# Patient Record
Sex: MALE | Race: OTHER | NOT HISPANIC OR LATINO | URBAN - METROPOLITAN AREA
[De-identification: names, ages, dates, MRNs, and addresses within clinical notes are randomized per-mention and may not be internally consistent; named-entity substitution may affect disease eponyms.]

---

## 2019-05-16 ENCOUNTER — INPATIENT (INPATIENT)
Facility: HOSPITAL | Age: 44
LOS: 3 days | Discharge: HOME | End: 2019-05-20
Attending: INTERNAL MEDICINE | Admitting: INTERNAL MEDICINE
Payer: COMMERCIAL

## 2019-05-16 VITALS
RESPIRATION RATE: 16 BRPM | DIASTOLIC BLOOD PRESSURE: 87 MMHG | OXYGEN SATURATION: 100 % | HEART RATE: 80 BPM | SYSTOLIC BLOOD PRESSURE: 131 MMHG

## 2019-05-16 DIAGNOSIS — Z98.84 BARIATRIC SURGERY STATUS: ICD-10-CM

## 2019-05-16 DIAGNOSIS — Z98.84 BARIATRIC SURGERY STATUS: Chronic | ICD-10-CM

## 2019-05-16 LAB
ALBUMIN SERPL ELPH-MCNC: 5.2 G/DL — HIGH (ref 3.3–5)
ALP SERPL-CCNC: 69 U/L — SIGNIFICANT CHANGE UP (ref 40–120)
ALT FLD-CCNC: 17 U/L — SIGNIFICANT CHANGE UP (ref 4–41)
ANION GAP SERPL CALC-SCNC: 15 MMO/L — HIGH (ref 7–14)
AST SERPL-CCNC: 22 U/L — SIGNIFICANT CHANGE UP (ref 4–40)
BASE EXCESS BLDV CALC-SCNC: 1.6 MMOL/L — SIGNIFICANT CHANGE UP
BASE EXCESS BLDV CALC-SCNC: 2.8 MMOL/L — SIGNIFICANT CHANGE UP
BASOPHILS # BLD AUTO: 0.03 K/UL — SIGNIFICANT CHANGE UP (ref 0–0.2)
BASOPHILS NFR BLD AUTO: 0.2 % — SIGNIFICANT CHANGE UP (ref 0–2)
BILIRUB SERPL-MCNC: 0.9 MG/DL — SIGNIFICANT CHANGE UP (ref 0.2–1.2)
BLD GP AB SCN SERPL QL: NEGATIVE — SIGNIFICANT CHANGE UP
BLOOD GAS VENOUS - CREATININE: 0.89 MG/DL — SIGNIFICANT CHANGE UP (ref 0.5–1.3)
BLOOD GAS VENOUS - CREATININE: 0.89 MG/DL — SIGNIFICANT CHANGE UP (ref 0.5–1.3)
BLOOD GAS VENOUS - FIO2: 21 — SIGNIFICANT CHANGE UP
BLOOD GAS VENOUS - FIO2: 21 — SIGNIFICANT CHANGE UP
BUN SERPL-MCNC: 16 MG/DL — SIGNIFICANT CHANGE UP (ref 7–23)
CALCIUM SERPL-MCNC: 10.4 MG/DL — SIGNIFICANT CHANGE UP (ref 8.4–10.5)
CHLORIDE BLDV-SCNC: 106 MMOL/L — SIGNIFICANT CHANGE UP (ref 96–108)
CHLORIDE BLDV-SCNC: 106 MMOL/L — SIGNIFICANT CHANGE UP (ref 96–108)
CHLORIDE SERPL-SCNC: 101 MMOL/L — SIGNIFICANT CHANGE UP (ref 98–107)
CO2 SERPL-SCNC: 26 MMOL/L — SIGNIFICANT CHANGE UP (ref 22–31)
CREAT SERPL-MCNC: 1.13 MG/DL — SIGNIFICANT CHANGE UP (ref 0.5–1.3)
EOSINOPHIL # BLD AUTO: 0.01 K/UL — SIGNIFICANT CHANGE UP (ref 0–0.5)
EOSINOPHIL NFR BLD AUTO: 0.1 % — SIGNIFICANT CHANGE UP (ref 0–6)
GAS PNL BLDV: 134 MMOL/L — LOW (ref 136–146)
GAS PNL BLDV: 135 MMOL/L — LOW (ref 136–146)
GLUCOSE BLDV-MCNC: 118 MG/DL — HIGH (ref 70–99)
GLUCOSE BLDV-MCNC: 122 MG/DL — HIGH (ref 70–99)
GLUCOSE SERPL-MCNC: 129 MG/DL — HIGH (ref 70–99)
HCO3 BLDV-SCNC: 24 MMOL/L — SIGNIFICANT CHANGE UP (ref 20–27)
HCO3 BLDV-SCNC: 25 MMOL/L — SIGNIFICANT CHANGE UP (ref 20–27)
HCT VFR BLD CALC: 47.1 % — SIGNIFICANT CHANGE UP (ref 39–50)
HCT VFR BLDV CALC: 43.9 % — SIGNIFICANT CHANGE UP (ref 39–51)
HCT VFR BLDV CALC: 47.7 % — SIGNIFICANT CHANGE UP (ref 39–51)
HGB BLD-MCNC: 15.2 G/DL — SIGNIFICANT CHANGE UP (ref 13–17)
HGB BLDV-MCNC: 14.3 G/DL — SIGNIFICANT CHANGE UP (ref 13–17)
HGB BLDV-MCNC: 15.6 G/DL — SIGNIFICANT CHANGE UP (ref 13–17)
IMM GRANULOCYTES NFR BLD AUTO: 0.4 % — SIGNIFICANT CHANGE UP (ref 0–1.5)
INR BLD: 1.04 — SIGNIFICANT CHANGE UP (ref 0.88–1.17)
LACTATE BLDV-MCNC: 1.9 MMOL/L — SIGNIFICANT CHANGE UP (ref 0.5–2)
LACTATE BLDV-MCNC: 2.3 MMOL/L — HIGH (ref 0.5–2)
LIDOCAIN IGE QN: 26.4 U/L — SIGNIFICANT CHANGE UP (ref 7–60)
LYMPHOCYTES # BLD AUTO: 1.35 K/UL — SIGNIFICANT CHANGE UP (ref 1–3.3)
LYMPHOCYTES # BLD AUTO: 9.3 % — LOW (ref 13–44)
MCHC RBC-ENTMCNC: 28 PG — SIGNIFICANT CHANGE UP (ref 27–34)
MCHC RBC-ENTMCNC: 32.3 % — SIGNIFICANT CHANGE UP (ref 32–36)
MCV RBC AUTO: 86.7 FL — SIGNIFICANT CHANGE UP (ref 80–100)
MONOCYTES # BLD AUTO: 0.77 K/UL — SIGNIFICANT CHANGE UP (ref 0–0.9)
MONOCYTES NFR BLD AUTO: 5.3 % — SIGNIFICANT CHANGE UP (ref 2–14)
NEUTROPHILS # BLD AUTO: 12.23 K/UL — HIGH (ref 1.8–7.4)
NEUTROPHILS NFR BLD AUTO: 84.7 % — HIGH (ref 43–77)
NRBC # FLD: 0 K/UL — SIGNIFICANT CHANGE UP (ref 0–0)
PCO2 BLDV: 47 MMHG — SIGNIFICANT CHANGE UP (ref 41–51)
PCO2 BLDV: 54 MMHG — HIGH (ref 41–51)
PH BLDV: 7.34 PH — SIGNIFICANT CHANGE UP (ref 7.32–7.43)
PH BLDV: 7.37 PH — SIGNIFICANT CHANGE UP (ref 7.32–7.43)
PLATELET # BLD AUTO: 373 K/UL — SIGNIFICANT CHANGE UP (ref 150–400)
PMV BLD: 9.7 FL — SIGNIFICANT CHANGE UP (ref 7–13)
PO2 BLDV: 35 MMHG — SIGNIFICANT CHANGE UP (ref 35–40)
PO2 BLDV: 37 MMHG — SIGNIFICANT CHANGE UP (ref 35–40)
POTASSIUM BLDV-SCNC: 3.9 MMOL/L — SIGNIFICANT CHANGE UP (ref 3.4–4.5)
POTASSIUM BLDV-SCNC: 4.7 MMOL/L — HIGH (ref 3.4–4.5)
POTASSIUM SERPL-MCNC: 4.2 MMOL/L — SIGNIFICANT CHANGE UP (ref 3.5–5.3)
POTASSIUM SERPL-SCNC: 4.2 MMOL/L — SIGNIFICANT CHANGE UP (ref 3.5–5.3)
PROT SERPL-MCNC: 8.2 G/DL — SIGNIFICANT CHANGE UP (ref 6–8.3)
PROTHROM AB SERPL-ACNC: 11.9 SEC — SIGNIFICANT CHANGE UP (ref 9.8–13.1)
RBC # BLD: 5.43 M/UL — SIGNIFICANT CHANGE UP (ref 4.2–5.8)
RBC # FLD: 12.5 % — SIGNIFICANT CHANGE UP (ref 10.3–14.5)
RH IG SCN BLD-IMP: POSITIVE — SIGNIFICANT CHANGE UP
SAO2 % BLDV: 58.2 % — LOW (ref 60–85)
SAO2 % BLDV: 65.4 % — SIGNIFICANT CHANGE UP (ref 60–85)
SODIUM SERPL-SCNC: 142 MMOL/L — SIGNIFICANT CHANGE UP (ref 135–145)
WBC # BLD: 14.45 K/UL — HIGH (ref 3.8–10.5)
WBC # FLD AUTO: 14.45 K/UL — HIGH (ref 3.8–10.5)

## 2019-05-16 PROCEDURE — 99223 1ST HOSP IP/OBS HIGH 75: CPT

## 2019-05-16 PROCEDURE — 74177 CT ABD & PELVIS W/CONTRAST: CPT | Mod: 26

## 2019-05-16 RX ORDER — CIPROFLOXACIN LACTATE 400MG/40ML
400 VIAL (ML) INTRAVENOUS ONCE
Refills: 0 | Status: COMPLETED | OUTPATIENT
Start: 2019-05-16 | End: 2019-05-16

## 2019-05-16 RX ORDER — ONDANSETRON 8 MG/1
4 TABLET, FILM COATED ORAL ONCE
Refills: 0 | Status: COMPLETED | OUTPATIENT
Start: 2019-05-16 | End: 2019-05-16

## 2019-05-16 RX ORDER — MORPHINE SULFATE 50 MG/1
4 CAPSULE, EXTENDED RELEASE ORAL ONCE
Refills: 0 | Status: DISCONTINUED | OUTPATIENT
Start: 2019-05-16 | End: 2019-05-16

## 2019-05-16 RX ORDER — SODIUM CHLORIDE 9 MG/ML
1000 INJECTION INTRAMUSCULAR; INTRAVENOUS; SUBCUTANEOUS ONCE
Refills: 0 | Status: COMPLETED | OUTPATIENT
Start: 2019-05-16 | End: 2019-05-16

## 2019-05-16 RX ORDER — METRONIDAZOLE 500 MG
500 TABLET ORAL ONCE
Refills: 0 | Status: COMPLETED | OUTPATIENT
Start: 2019-05-16 | End: 2019-05-16

## 2019-05-16 RX ADMIN — MORPHINE SULFATE 4 MILLIGRAM(S): 50 CAPSULE, EXTENDED RELEASE ORAL at 17:23

## 2019-05-16 RX ADMIN — MORPHINE SULFATE 4 MILLIGRAM(S): 50 CAPSULE, EXTENDED RELEASE ORAL at 22:05

## 2019-05-16 RX ADMIN — MORPHINE SULFATE 4 MILLIGRAM(S): 50 CAPSULE, EXTENDED RELEASE ORAL at 17:45

## 2019-05-16 RX ADMIN — Medication 200 MILLIGRAM(S): at 19:42

## 2019-05-16 RX ADMIN — Medication 100 MILLIGRAM(S): at 20:39

## 2019-05-16 RX ADMIN — SODIUM CHLORIDE 1000 MILLILITER(S): 9 INJECTION INTRAMUSCULAR; INTRAVENOUS; SUBCUTANEOUS at 17:23

## 2019-05-16 RX ADMIN — ONDANSETRON 4 MILLIGRAM(S): 8 TABLET, FILM COATED ORAL at 17:23

## 2019-05-16 NOTE — ED ADULT TRIAGE NOTE - CHIEF COMPLAINT QUOTE
Pt c/o abd pain, N/V and constipation since this am.  Denies chills.  Pt with hx of gastric sleeve Pt c/o abd pain, radiating to lower back ,  N/V and constipation since this am.  Denies chills.  Pt with hx of gastric sleeve

## 2019-05-16 NOTE — ED ADULT NURSE NOTE - OBJECTIVE STATEMENT
Pt a&ox3 c/o abdominal pain with n/v and constipation since this morning, breathing even and unlabored, denies cp/discomfort, denies headache/dizziness, abd soft, tender, non-distended, skin is cool dry and intact, ivl placed, labs sent, md at bedside, will continue to monitor.

## 2019-05-16 NOTE — ED PROVIDER NOTE - ATTENDING CONTRIBUTION TO CARE
44 year old male c/o abdominal pain x one day with nausea. PE: NAD, + RLQ tenderness with guarding. I&P: 44 year old male c/o abdominal pain x one day with nausea. PE: NAD, + RLQ tenderness with guarding. I&P: CT demonstrates ileus and terminal ileitis, abx given, surgery consulted, will admit to CDU for observation 44 year old male c/o abdominal pain x one day with nausea. PE: NAD, + RLQ tenderness with guarding. I&P: CT demonstrates ileus and terminal ileitis, abx given, surgery consulted, will admit observation and symptom control.

## 2019-05-16 NOTE — ED PROVIDER NOTE - PROGRESS NOTE DETAILS
ANA MARIA CLIFTON:  General surgery has no further recommendations at this time other than bowel rest and gi referral.  pt accepted to Dr. Reynolds.  MAR text paged at this time.

## 2019-05-16 NOTE — ED ADULT NURSE NOTE - CHIEF COMPLAINT QUOTE
Pt c/o abd pain, radiating to lower back ,  N/V and constipation since this am.  Denies chills.  Pt with hx of gastric sleeve

## 2019-05-16 NOTE — ED ADULT NURSE NOTE - NSIMPLEMENTINTERV_GEN_ALL_ED
Implemented All Universal Safety Interventions:  [place] to call system. Call bell, personal items and telephone within reach. Instruct patient to call for assistance. Room bathroom lighting operational. Non-slip footwear when patient is off stretcher. Physically safe environment: no spills, clutter or unnecessary equipment. Stretcher in lowest position, wheels locked, appropriate side rails in place.

## 2019-05-16 NOTE — ED PROVIDER NOTE - CLINICAL SUMMARY MEDICAL DECISION MAKING FREE TEXT BOX
Pt is a 45 y/o M nonsmoker PMHx gastric sleeve (2016) p/w abdominal pain today -- r/o bowel obstruction, possible pancreatitis, low clinical concern for aortic dissection or ruptured AAA -- labs, lipase, vbg, pre-ops, ct abd and pelvis, pain control

## 2019-05-17 DIAGNOSIS — K50.012 CROHN'S DISEASE OF SMALL INTESTINE WITH INTESTINAL OBSTRUCTION: ICD-10-CM

## 2019-05-17 DIAGNOSIS — Z29.9 ENCOUNTER FOR PROPHYLACTIC MEASURES, UNSPECIFIED: ICD-10-CM

## 2019-05-17 LAB
ALBUMIN SERPL ELPH-MCNC: 4.3 G/DL — SIGNIFICANT CHANGE UP (ref 3.3–5)
ALP SERPL-CCNC: 59 U/L — SIGNIFICANT CHANGE UP (ref 40–120)
ALT FLD-CCNC: 11 U/L — SIGNIFICANT CHANGE UP (ref 4–41)
ANION GAP SERPL CALC-SCNC: 11 MMO/L — SIGNIFICANT CHANGE UP (ref 7–14)
APTT BLD: 31.2 SEC — SIGNIFICANT CHANGE UP (ref 27.5–36.3)
AST SERPL-CCNC: 13 U/L — SIGNIFICANT CHANGE UP (ref 4–40)
BASOPHILS # BLD AUTO: 0.02 K/UL — SIGNIFICANT CHANGE UP (ref 0–0.2)
BASOPHILS NFR BLD AUTO: 0.2 % — SIGNIFICANT CHANGE UP (ref 0–2)
BILIRUB SERPL-MCNC: 1 MG/DL — SIGNIFICANT CHANGE UP (ref 0.2–1.2)
BUN SERPL-MCNC: 13 MG/DL — SIGNIFICANT CHANGE UP (ref 7–23)
CALCIUM SERPL-MCNC: 9.2 MG/DL — SIGNIFICANT CHANGE UP (ref 8.4–10.5)
CHLORIDE SERPL-SCNC: 103 MMOL/L — SIGNIFICANT CHANGE UP (ref 98–107)
CO2 SERPL-SCNC: 26 MMOL/L — SIGNIFICANT CHANGE UP (ref 22–31)
CREAT SERPL-MCNC: 1.19 MG/DL — SIGNIFICANT CHANGE UP (ref 0.5–1.3)
CRP SERPL-MCNC: 10.2 MG/L — HIGH
EOSINOPHIL # BLD AUTO: 0.2 K/UL — SIGNIFICANT CHANGE UP (ref 0–0.5)
EOSINOPHIL NFR BLD AUTO: 1.8 % — SIGNIFICANT CHANGE UP (ref 0–6)
ERYTHROCYTE [SEDIMENTATION RATE] IN BLOOD: 13 MM/HR — SIGNIFICANT CHANGE UP (ref 1–15)
GLUCOSE SERPL-MCNC: 110 MG/DL — HIGH (ref 70–99)
HCT VFR BLD CALC: 43.9 % — SIGNIFICANT CHANGE UP (ref 39–50)
HGB BLD-MCNC: 14.1 G/DL — SIGNIFICANT CHANGE UP (ref 13–17)
IMM GRANULOCYTES NFR BLD AUTO: 0.4 % — SIGNIFICANT CHANGE UP (ref 0–1.5)
INR BLD: 1 — SIGNIFICANT CHANGE UP (ref 0.88–1.17)
LYMPHOCYTES # BLD AUTO: 2.66 K/UL — SIGNIFICANT CHANGE UP (ref 1–3.3)
LYMPHOCYTES # BLD AUTO: 24.2 % — SIGNIFICANT CHANGE UP (ref 13–44)
MAGNESIUM SERPL-MCNC: 2 MG/DL — SIGNIFICANT CHANGE UP (ref 1.6–2.6)
MCHC RBC-ENTMCNC: 28.1 PG — SIGNIFICANT CHANGE UP (ref 27–34)
MCHC RBC-ENTMCNC: 32.1 % — SIGNIFICANT CHANGE UP (ref 32–36)
MCV RBC AUTO: 87.5 FL — SIGNIFICANT CHANGE UP (ref 80–100)
MONOCYTES # BLD AUTO: 1.1 K/UL — HIGH (ref 0–0.9)
MONOCYTES NFR BLD AUTO: 10 % — SIGNIFICANT CHANGE UP (ref 2–14)
NEUTROPHILS # BLD AUTO: 6.98 K/UL — SIGNIFICANT CHANGE UP (ref 1.8–7.4)
NEUTROPHILS NFR BLD AUTO: 63.4 % — SIGNIFICANT CHANGE UP (ref 43–77)
NRBC # FLD: 0 K/UL — SIGNIFICANT CHANGE UP (ref 0–0)
PHOSPHATE SERPL-MCNC: 3.2 MG/DL — SIGNIFICANT CHANGE UP (ref 2.5–4.5)
PLATELET # BLD AUTO: 348 K/UL — SIGNIFICANT CHANGE UP (ref 150–400)
PMV BLD: 9.7 FL — SIGNIFICANT CHANGE UP (ref 7–13)
POTASSIUM SERPL-MCNC: 3.8 MMOL/L — SIGNIFICANT CHANGE UP (ref 3.5–5.3)
POTASSIUM SERPL-SCNC: 3.8 MMOL/L — SIGNIFICANT CHANGE UP (ref 3.5–5.3)
PROT SERPL-MCNC: 7 G/DL — SIGNIFICANT CHANGE UP (ref 6–8.3)
PROTHROM AB SERPL-ACNC: 11.4 SEC — SIGNIFICANT CHANGE UP (ref 9.8–13.1)
RBC # BLD: 5.02 M/UL — SIGNIFICANT CHANGE UP (ref 4.2–5.8)
RBC # FLD: 12.6 % — SIGNIFICANT CHANGE UP (ref 10.3–14.5)
SODIUM SERPL-SCNC: 140 MMOL/L — SIGNIFICANT CHANGE UP (ref 135–145)
WBC # BLD: 11 K/UL — HIGH (ref 3.8–10.5)
WBC # FLD AUTO: 11 K/UL — HIGH (ref 3.8–10.5)

## 2019-05-17 PROCEDURE — 99233 SBSQ HOSP IP/OBS HIGH 50: CPT

## 2019-05-17 RX ORDER — CIPROFLOXACIN LACTATE 400MG/40ML
400 VIAL (ML) INTRAVENOUS EVERY 12 HOURS
Refills: 0 | Status: DISCONTINUED | OUTPATIENT
Start: 2019-05-17 | End: 2019-05-20

## 2019-05-17 RX ORDER — METRONIDAZOLE 500 MG
500 TABLET ORAL EVERY 8 HOURS
Refills: 0 | Status: DISCONTINUED | OUTPATIENT
Start: 2019-05-17 | End: 2019-05-20

## 2019-05-17 RX ORDER — SODIUM CHLORIDE 9 MG/ML
1000 INJECTION, SOLUTION INTRAVENOUS
Refills: 0 | Status: DISCONTINUED | OUTPATIENT
Start: 2019-05-17 | End: 2019-05-19

## 2019-05-17 RX ORDER — OXYCODONE AND ACETAMINOPHEN 5; 325 MG/1; MG/1
1 TABLET ORAL EVERY 6 HOURS
Refills: 0 | Status: DISCONTINUED | OUTPATIENT
Start: 2019-05-17 | End: 2019-05-20

## 2019-05-17 RX ADMIN — SODIUM CHLORIDE 100 MILLILITER(S): 9 INJECTION, SOLUTION INTRAVENOUS at 05:14

## 2019-05-17 RX ADMIN — Medication 200 MILLIGRAM(S): at 09:53

## 2019-05-17 RX ADMIN — Medication 100 MILLIGRAM(S): at 14:29

## 2019-05-17 RX ADMIN — Medication 200 MILLIGRAM(S): at 18:07

## 2019-05-17 RX ADMIN — OXYCODONE AND ACETAMINOPHEN 1 TABLET(S): 5; 325 TABLET ORAL at 22:24

## 2019-05-17 RX ADMIN — OXYCODONE AND ACETAMINOPHEN 1 TABLET(S): 5; 325 TABLET ORAL at 15:38

## 2019-05-17 RX ADMIN — Medication 100 MILLIGRAM(S): at 21:49

## 2019-05-17 RX ADMIN — OXYCODONE AND ACETAMINOPHEN 1 TABLET(S): 5; 325 TABLET ORAL at 21:47

## 2019-05-17 RX ADMIN — Medication 100 MILLIGRAM(S): at 07:54

## 2019-05-17 RX ADMIN — OXYCODONE AND ACETAMINOPHEN 1 TABLET(S): 5; 325 TABLET ORAL at 16:07

## 2019-05-17 NOTE — H&P ADULT - PROBLEM SELECTOR PLAN 2
- DVT ppx: Pt low risk for VTE with IMPROVE VTE score 0. Encourage OOB and ambulation as tolerated.  - Diet: NPO pending GI consult

## 2019-05-17 NOTE — H&P ADULT - NSHPPHYSICALEXAM_GEN_ALL_CORE
Vital Signs Last 24 Hrs  T(C): 36.8 (17 May 2019 05:13), Max: 37.2 (16 May 2019 23:48)  T(F): 98.3 (17 May 2019 05:13), Max: 98.9 (16 May 2019 23:48)  HR: 93 (17 May 2019 05:13) (80 - 93)  BP: 116/75 (17 May 2019 05:13) (116/75 - 131/87)  BP(mean): --  RR: 18 (17 May 2019 05:13) (16 - 18)  SpO2: 98% (17 May 2019 05:13) (97% - 100%)    PHYSICAL EXAM:  General: Awake and alert.  No acute distress.  Head: Normocephalic, atraumatic.    Eyes: PERRL.  EOMI.  No scleral icterus.    Mouth: Moist MM.  No oropharyngeal exudates.    Neck: Supple.  Full range of motion.  No JVD.  No thyromegaly.  Trachea midline.  No lymphadenopathy.   Heart: RRR.  Normal S1 and S2.  No murmurs, rubs, or gallops.  No LE edema b/l.   Lungs: Good inspiratory effort.  Nonlabored breathing.  CTAB.  No wheezes, crackles, or rhonchi.    Abdomen: Hypoactive, tympanic bowel sounds.  Mild TTP in RLQ, no rebound or guarding.  Nondistended.  Hepatomegaly.  Skin: Warm and dry.  No rashes.  Extremities: No clubbing or cyanosis.  2+ peripheral pulses b/l.  Musculoskeletal: No joint deformities.  No spinal or paraspinal tenderness.  Neuro: A&Ox3.  CN II-XII intact.  5/5 motor strength in UE and LE b/l.  Tactile sensation intact in UE and LE b/l.  No focal deficits.

## 2019-05-17 NOTE — PROGRESS NOTE ADULT - ASSESSMENT
45 yo man with history of morbid obesity s/p gastric sleeve (2016) presents with 1 day of RLQ abdominal pain, found to have CT A/P findings consistent with terminal ileitis and possibly associated with mild small bowel obstruction. Evaluated by surgery, no bowel obstruction. symptoms clinically improving. no nausea vomiting, had watery BM yesterday.

## 2019-05-17 NOTE — H&P ADULT - NSHPREVIEWOFSYSTEMS_GEN_ALL_CORE
Constitutional: No weakness, fevers, chills, or weight loss  Eyes: No visual changes, double vision, or eye pain  Ears, Nose, Mouth, Throat: No runny nose, sinus pain, ear pain, tinnitus, sore throat, dysphagia, or odynophagia  Cardiovascular: No chest pain, palpitations, or LE edema  Respiratory: No cough, wheezing, hemoptysis, or shortness of breath  Gastrointestinal: +RLQ abdominal pain. +Nausea and vomiting. No diarrhea/constipation, hematemesis, BRBPR, or melena.  Genitourinary: No dysuria, frequency, urgency, or hematuria  Musculoskeletal: No joint pain, joint swelling, or decreased ROM  Skin: No pruritus or rashes  Neurologic:  No seizures, headache, paresthesias, numbness, or limb weakness  Psychiatric: No depression, anxiety, or agitation  Endocrine: No heat/cold intolerance, mood swings, sweats, polydipsia, or polyuria  Hematologic/lymphatic: No purpura, petechia, or prolonged or excessive bleeding after dental extraction / injury    Positives and pertinent negatives noted and all other systems negative.

## 2019-05-17 NOTE — H&P ADULT - NSHPSOCIALHISTORY_GEN_ALL_CORE
Tobacco: denies  EtOH: occasional EtOH use (~1 drink per month)  Illicit drugs: denies  Lives with wife and 3 sons.

## 2019-05-17 NOTE — H&P ADULT - PROBLEM SELECTOR PLAN 1
Can be 2/2 IBD, specifically Crohn's disease, vs. infectious cause vs. less likely malignancy. Pt with leukocytosis, which can be reactive, as pt with no other systemic S/S of infection.   - GI consult in AM for possible endoscopy  - Keep NPO and c/w IVF with LR at 100 cc/hr  - Will c/w IV Cipro and Flagyl for now, as antibiotics even in setting of Crohn's flare can have beneficial effect  - Serial abdominal exams  - Surgery consult obtained for possible small bowel obstruction. Recs reviewed and appreciated. Per Surgery, no bowel obstruction and no need for surgical intervention.   - Pain control with Percocet 5/325 mg q6hrs PRN for moderate-severe pain

## 2019-05-17 NOTE — PROGRESS NOTE ADULT - PROBLEM SELECTOR PLAN 1
Can be 2/2 IBD, specifically Crohn's disease, vs. infectious cause vs. less likely malignancy. Pt with leukocytosis, which can be reactive, as pt with no other systemic S/S of infection.   - GI consult in AM for possible endoscopy  - Keep NPO and c/w IVF with LR at 100 cc/hr  - Will c/w IV Cipro and Flagyl for now, as antibiotics even in setting of Crohn's flare can have beneficial effect  - Serial abdominal exams  - Surgery consult recs reviewed and appreciated. Per Surgery, no bowel obstruction and no need for surgical intervention.   - Pain control with Percocet 5/325 mg q6hrs PRN for moderate-severe pain  - GI consulted, evaluate for Crohn's.   - given improvement of symptoms, will advance diet to clear liquids and monitor response.  - ESR/CRP and possible endoscopy by GI.   - c/w Cipro/flagyl.

## 2019-05-17 NOTE — H&P ADULT - HISTORY OF PRESENT ILLNESS
43 yo man with history of morbid obesity s/p gastric sleeve (2016) presents with 1 day of RLQ abdominal pain. Pt was awoken early Thursday morning ~2am with 10/10 in intensity, nonradiating RLQ abdominal pain, described as squeezing and crampy. 45 yo man with history of morbid obesity s/p gastric sleeve (2016) presents with 1 day of RLQ abdominal pain. Pt was awoken early Thursday morning ~2am with 10/10 in intensity, nonradiating RLQ abdominal pain, described as squeezing and crampy. The pain is intermittent, with each episode lasting a few minutes. Since onset of the pain, pt's appetite has been nonexistent. Pt states that he had only a few bites of toast on Thursday. Pt denies any associated F/C, diarrhea, melena, BRBPR, recent weight loss, joint pain, or rashes. Pt's last BM was on Wednesday night and normal but has not passed any flatus since then. Pt had one brief episode of nausea with NBNB emesis on Thursday afternoon. No known family history of IBD. Pt's last meal prior to onset of the pain was pizza, which other people also ate without developing any symptoms. No CP, SOB, palpitations, cough, runny nose, sore throat, or urinary symptoms.    In the ED,  T 97.8-98.2, HR 80-88, -131/62-87, RR 16-18, O2 sats % RA.  CT A/P showed mild thickening and wall hyperenhancement of the terminal ileum, consistent with terminal ileitis, and associated with mild small bowel obstruction.

## 2019-05-17 NOTE — H&P ADULT - NSHPLABSRESULTS_GEN_ALL_CORE
Imaging personally reviewed.  CT Abdomen and Pelvis w/ IV Cont (05.16.19 @ 17:30)     FINDINGS:    LOWER CHEST: Within normal limits.    LIVER: Within normal limits.  BILE DUCTS: Normal caliber.  GALLBLADDER: Within normal limits.  SPLEEN: Within normal limits.  PANCREAS: Within normal limits.  ADRENALS: Within normal limits.  KIDNEYS/URETERS: Within normal limits.    BLADDER: Within normal limits.  REPRODUCTIVE ORGANS: Prostate within normal limits.    BOWEL: Status post sleeve gastrectomy. There is mild thickening and wall   hyperenhancement of the terminal ileum, with evidence of beaking of the   mesenteric border, best appreciated on sagittal image 44. The more   proximal loops of small bowel are mildly dilated and fluid-filled.   Appendix normal.  PERITONEUM: Trace simple ascites. No free air.  VESSELS:  Mild atherosclerotic calcifications.  RETROPERITONEUM: No lymphadenopathy.    ABDOMINAL WALL: Within normal limits.  BONES: Within normal limits.    IMPRESSION:     Terminal ileitis, which can be seen in the setting of Crohn's disease.   Associated mild small bowel obstruction.

## 2019-05-17 NOTE — H&P ADULT - ASSESSMENT
43 yo man with history of morbid obesity s/p gastric sleeve (2016) presents with 1 day of RLQ abdominal pain, found to have CT A/P findings consistent with terminal ileitis and possibly associated with mild small bowel obstruction.

## 2019-05-17 NOTE — PROGRESS NOTE ADULT - PROBLEM SELECTOR PLAN 2
- DVT ppx: Pt low risk for VTE with IMPROVE VTE score 0. Encourage OOB and ambulation as tolerated.  - Diet: clears.

## 2019-05-17 NOTE — CONSULT NOTE ADULT - ASSESSMENT
Impression:  1) RLQ abdominal pain with CT showing terminal ileitis: differential IBD (Crohn's disease > ulcerative colitis) versus infection versus medication induced (ie/ NSAIDs)   2) Small bowel obstruction as seen on CT   3) History of obesity with gastric sleeve    Recommendations:  - continue management of SBO per surgery - NPO, IVF; if patient develops distension/nausea/vomiting would place NGT for decompression   - send ESR, CRP, fecal calprotectin   - send GI PCR  - will eventually need colonoscopy pending resolution of SBO  - avoid NSAIDs  - pain control, anti-emetics as needed

## 2019-05-17 NOTE — CONSULT NOTE ADULT - SUBJECTIVE AND OBJECTIVE BOX
GASTROENTEROLOGY INITIAL CONSULT NOTE    Chief Complaint:  Patient is a 44y old  Male who presents with a chief complaint of RLQ abdominal pain (17 May 2019 05:55)      HPI: 44y Male with past medical history morbid obesity s/p gastric sleeve (2016) who presented with abdominal pain. Patient reported one day of abdominal pain which awoke him from sleep, 10/10, crampy, non-radiating, intermittent, localized to the RLQ. This has been associated with decreased appetite and nausea with one episode of vomiting. No melena, hematochezia, diarrhea, or constipation. Last BM was the day prior to admission which was formed brown stool. Since the pain started, he has not passed gas or stool. No joint pain, rash, vision changes, or weight loss. No recent travel or sick contacts. No family history of IBD. No prior colonoscopy.    Allergies:  No Known Allergies      Hospital Medications:  ciprofloxacin   IVPB 400 milliGRAM(s) IV Intermittent every 12 hours  lactated ringers. 1000 milliLiter(s) IV Continuous <Continuous>  metroNIDAZOLE  IVPB 500 milliGRAM(s) IV Intermittent every 8 hours  oxyCODONE    5 mG/acetaminophen 325 mG 1 Tablet(s) Oral every 6 hours PRN      PMHX/PSHX:  No pertinent past medical history  Status post laparoscopic sleeve gastrectomy      Family history:  No pertinent family history in first degree relatives      Social History:     ROS:     General:  No wt loss, fevers, chills, night sweats, fatigue,   Eyes:  Good vision, no reported pain  ENT:  No sore throat, pain, runny nose, dysphagia  CV:  No pain, palpitations, hypo/hypertension  Resp:  No dyspnea, cough, tachypnea, wheezing  GI:  see HPI  :  No pain, bleeding, incontinence, nocturia  Muscle:  No pain, weakness  Neuro:  No weakness, tingling, memory problems  Psych:  No fatigue, insomnia, mood problems, depression  Endocrine:  No polyuria, polydipsia, cold/heat intolerance  Heme:  No petechiae, ecchymosis, easy bruisability  Skin:  No rash, tattoos, scars, edema      PHYSICAL EXAM:   Vital Signs:  Vital Signs Last 24 Hrs  T(C): 36.8 (17 May 2019 05:13), Max: 37.2 (16 May 2019 23:48)  T(F): 98.3 (17 May 2019 05:13), Max: 98.9 (16 May 2019 23:48)  HR: 93 (17 May 2019 05:13) (80 - 93)  BP: 116/75 (17 May 2019 05:13) (116/75 - 131/87)  BP(mean): --  RR: 18 (17 May 2019 05:13) (16 - 18)  SpO2: 98% (17 May 2019 05:13) (97% - 100%)  Daily Height in cm: 177.8 (16 May 2019 23:48)    Daily     GENERAL:  no acute distress  HEENT:  -icterus   CHEST:  clear bilaterally, no wheezes or rales  HEART:  RRR, S1S2  ABDOMEN:  soft, non-tender, non-distended, normoactive bowel sounds,  no hepato-splenomegaly  EXTEREMITIES:  no  edema  SKIN:  No rash/erythema/ecchymoses/petechiae/wounds/abscess/warm/dry  NEURO:  alert, oriented, conversant     LABS:                        14.1   11.00 )-----------( 348      ( 17 May 2019 06:00 )             43.9     05-17    140  |  103  |  13  ----------------------------<  110<H>  3.8   |  26  |  1.19    Ca    9.2      17 May 2019 06:00  Phos  3.2     05-17  Mg     2.0     05-17    TPro  7.0  /  Alb  4.3  /  TBili  1.0  /  DBili  x   /  AST  13  /  ALT  11  /  AlkPhos  59  05-17    LIVER FUNCTIONS - ( 17 May 2019 06:00 )  Alb: 4.3 g/dL / Pro: 7.0 g/dL / ALK PHOS: 59 u/L / ALT: 11 u/L / AST: 13 u/L / GGT: x           PT/INR - ( 17 May 2019 06:00 )   PT: 11.4 SEC;   INR: 1.00          PTT - ( 17 May 2019 06:00 )  PTT:31.2 SEC  Amylase Serum--      Lipase serum26.4       Ammonia--< from: CT Abdomen and Pelvis w/ IV Cont (05.16.19 @ 17:30) >  LOWER CHEST: Within normal limits.    LIVER: Within normal limits.  BILE DUCTS: Normal caliber.  GALLBLADDER: Within normal limits.  SPLEEN: Within normal limits.  PANCREAS: Within normal limits.  ADRENALS: Within normal limits.  KIDNEYS/URETERS: Within normal limits.    BLADDER: Within normal limits.  REPRODUCTIVE ORGANS: Prostate within normal limits.    BOWEL: Status post sleeve gastrectomy. There is mild thickening and wall   hyperenhancement of the terminal ileum, with evidence of beaking of the   mesenteric border, best appreciated on sagittal image 44. The more   proximal loops of small bowel are mildly dilated and fluid-filled.   Appendix normal.  PERITONEUM: Trace simple ascites. No free air.  VESSELS:  Mild atherosclerotic calcifications.  RETROPERITONEUM: No lymphadenopathy.    ABDOMINAL WALL: Within normal limits.  BONES: Within normal limits.    IMPRESSION:     Terminal ileitis, which can be seen in the setting of Crohn's disease.   Associated mild small bowel obstruction.    Findings were discussed with Dr. Mccullough in the ER by Dr. Galvez on 5/16/2019   at 6:18 PM with read back.                    NIA GALVEZ M.D., RADIOLOGY RESIDENT  This document has been electronically signed.  ZANE MCCOY M.D., ATTENDING RADIOLOGIST  This document has been electronically signed. May 16 2019  6:22PM    < end of copied text >        Imaging:
[facility] General Surgery Consultation     Patient is a 44y old  Male who presents with a chief complaint of abdominal pain     HPI:  This is a 45 y/o male with a surgical history significant for gastric sleeve (2016) p/w abdominal pain today.  He wake up at 2 AM today secondary to the pain, which was generalized,  cramping abdominal pain. The pain is in the RLQ, waxing and waning, moderate to severe in intensity without any specific triggering, aggravating or alleviating factors.  Pt notes associated nausea and nonbloody nonbilious vomiting today. He denies diarrhea, fevers.  Last BM yesterday; not passing gas today.  No family h/o IBD      PAST MEDICAL & SURGICAL HISTORY:  No pertinent past medical history  Status post laparoscopic sleeve gastrectomy      FAMILY HISTORY:      SOCIAL HISTORY: not smoker, drinks socially     MEDICATIONS  (STANDING):  metroNIDAZOLE  IVPB 500 milliGRAM(s) IV Intermittent Once    MEDICATIONS  (PRN):    Allergies    No Known Allergies    Intolerances        Vital Signs Last 24 Hrs  T(C): 36.8 (16 May 2019 19:42), Max: 36.8 (16 May 2019 17:24)  T(F): 98.2 (16 May 2019 19:42), Max: 98.2 (16 May 2019 17:24)  HR: 82 (16 May 2019 19:42) (80 - 82)  BP: 128/85 (16 May 2019 19:42) (128/85 - 131/87)  BP(mean): --  RR: 18 (16 May 2019 19:42) (16 - 18)  SpO2: 100% (16 May 2019 19:42) (100% - 100%)  Daily     Daily     Gen: NAD  Resp: CTA b/l   CV: RRR  Abd: soft, mildly distended, not tender, no generalized peritonitis or focal peritonitis.   Ext: warm                         15.2   14.45 )-----------( 373      ( 16 May 2019 17:04 )             47.1     05-16    142  |  101  |  16  ----------------------------<  129<H>  4.2   |  26  |  1.13    Ca    10.4      16 May 2019 17:04    TPro  8.2  /  Alb  5.2<H>  /  TBili  0.9  /  DBili  x   /  AST  22  /  ALT  17  /  AlkPhos  69  05-16    PT/INR - ( 16 May 2019 17:04 )   PT: 11.9 SEC;   INR: 1.04                Radiographic Findings:   CT abd/pelvis":  FINDINGS:    LOWER CHEST: Within normal limits.    LIVER: Within normal limits.  BILE DUCTS: Normal caliber.  GALLBLADDER: Within normal limits.  SPLEEN: Within normal limits.  PANCREAS: Within normal limits.  ADRENALS: Within normal limits.  KIDNEYS/URETERS: Within normal limits.    BLADDER: Within normal limits.  REPRODUCTIVE ORGANS: Prostate within normal limits.    BOWEL: Status post sleeve gastrectomy. There is mild thickening and wall   hyperenhancement of the terminal ileum, with evidence of beaking of the   mesenteric border, best appreciated on sagittal image 44. The more   proximal loops of small bowel are mildly dilated and fluid-filled.   Appendix normal.  PERITONEUM: Trace simple ascites. No free air.  VESSELS:  Mild atherosclerotic calcifications.  RETROPERITONEUM: No lymphadenopathy.    ABDOMINAL WALL: Within normal limits.  BONES: Within normal limits.    IMPRESSION:     Terminal ileitis, which can be seen in the setting of Crohn's disease.   Associated mild small bowel obstruction.    Findings were discussed with Dr. Mccullough in the ER by Dr. Nolasco on 5/16/2019   at 6:18 PM with read back.                      Assessment:   43 yo male, with h/o gastric sleeve in 2016, here with terminal ileitis. No bowel obstruction.     - bowel rest, ivf, no need for surgical intervention, needs GI referral to rule out IBD, terminal ileitis can be due to Crohn's disease.   - discussed with Dr. White     [id number]

## 2019-05-17 NOTE — PROGRESS NOTE ADULT - SUBJECTIVE AND OBJECTIVE BOX
Patient is a 44y old  Male who presents with a chief complaint of RLQ abdominal pain (17 May 2019 09:47)      SUBJECTIVE / OVERNIGHT EVENTS:  no events overnight. patient reporting pain is intermittent but controlled. last received morphine was last night.   no nausea vomiting. no fever, chills. had one loose BM in ED.   evaluated by surgery, no bowel obstruction.     MEDICATIONS  (STANDING):  ciprofloxacin   IVPB 400 milliGRAM(s) IV Intermittent every 12 hours  lactated ringers. 1000 milliLiter(s) (100 mL/Hr) IV Continuous <Continuous>  metroNIDAZOLE  IVPB 500 milliGRAM(s) IV Intermittent every 8 hours    MEDICATIONS  (PRN):  oxyCODONE    5 mG/acetaminophen 325 mG 1 Tablet(s) Oral every 6 hours PRN Moderate to Severe Pain      PHYSICAL EXAM:  T(C): 36.9 (05-17-19 @ 14:09), Max: 37.2 (05-16-19 @ 23:48)  HR: 78 (05-17-19 @ 14:09) (78 - 93)  BP: 112/63 (05-17-19 @ 14:09) (112/63 - 131/87)  RR: 17 (05-17-19 @ 14:09) (16 - 18)  SpO2: 99% (05-17-19 @ 14:09) (97% - 100%)  I&O's Summary    Gen: NAD; resting in bed;  Pulm: no respiratory distress; CTA b/l; no wheezing  Card: RRR; S1/S2 wnl; no obvious murmurs  Abd: soft; +bs; ttp over RLQ on deep palpation. no distension, no guarding.   Ext: no joint swelling; no edema       LABS:  CAPILLARY BLOOD GLUCOSE                              14.1   11.00 )-----------( 348      ( 17 May 2019 06:00 )             43.9     05-17    140  |  103  |  13  ----------------------------<  110<H>  3.8   |  26  |  1.19    Ca    9.2      17 May 2019 06:00  Phos  3.2     05-17  Mg     2.0     05-17    TPro  7.0  /  Alb  4.3  /  TBili  1.0  /  DBili  x   /  AST  13  /  ALT  11  /  AlkPhos  59  05-17    PT/INR - ( 17 May 2019 06:00 )   PT: 11.4 SEC;   INR: 1.00          PTT - ( 17 May 2019 06:00 )  PTT:31.2 SEC          RADIOLOGY & ADDITIONAL TESTS:    Imaging Personally Reviewed:    Consultant(s) Notes Reviewed:      Care Discussed with Consultants/Other Providers:

## 2019-05-17 NOTE — CHART NOTE - NSCHARTNOTEFT_GEN_A_CORE
Case discussed with medical attending. Patient's pain is controlled at this time. NO n/V. Will start clear liquid diet and monitor.

## 2019-05-18 LAB
ANION GAP SERPL CALC-SCNC: 13 MMO/L — SIGNIFICANT CHANGE UP (ref 7–14)
BUN SERPL-MCNC: 14 MG/DL — SIGNIFICANT CHANGE UP (ref 7–23)
CALCIUM SERPL-MCNC: 9.3 MG/DL — SIGNIFICANT CHANGE UP (ref 8.4–10.5)
CHLORIDE SERPL-SCNC: 103 MMOL/L — SIGNIFICANT CHANGE UP (ref 98–107)
CO2 SERPL-SCNC: 24 MMOL/L — SIGNIFICANT CHANGE UP (ref 22–31)
CREAT SERPL-MCNC: 1.1 MG/DL — SIGNIFICANT CHANGE UP (ref 0.5–1.3)
GLUCOSE SERPL-MCNC: 92 MG/DL — SIGNIFICANT CHANGE UP (ref 70–99)
HCT VFR BLD CALC: 43.4 % — SIGNIFICANT CHANGE UP (ref 39–50)
HGB BLD-MCNC: 14.1 G/DL — SIGNIFICANT CHANGE UP (ref 13–17)
MCHC RBC-ENTMCNC: 28.5 PG — SIGNIFICANT CHANGE UP (ref 27–34)
MCHC RBC-ENTMCNC: 32.5 % — SIGNIFICANT CHANGE UP (ref 32–36)
MCV RBC AUTO: 87.9 FL — SIGNIFICANT CHANGE UP (ref 80–100)
NRBC # FLD: 0 K/UL — SIGNIFICANT CHANGE UP (ref 0–0)
PLATELET # BLD AUTO: 336 K/UL — SIGNIFICANT CHANGE UP (ref 150–400)
PMV BLD: 9.8 FL — SIGNIFICANT CHANGE UP (ref 7–13)
POTASSIUM SERPL-MCNC: 3.7 MMOL/L — SIGNIFICANT CHANGE UP (ref 3.5–5.3)
POTASSIUM SERPL-SCNC: 3.7 MMOL/L — SIGNIFICANT CHANGE UP (ref 3.5–5.3)
RBC # BLD: 4.94 M/UL — SIGNIFICANT CHANGE UP (ref 4.2–5.8)
RBC # FLD: 12.5 % — SIGNIFICANT CHANGE UP (ref 10.3–14.5)
SODIUM SERPL-SCNC: 140 MMOL/L — SIGNIFICANT CHANGE UP (ref 135–145)
WBC # BLD: 8.66 K/UL — SIGNIFICANT CHANGE UP (ref 3.8–10.5)
WBC # FLD AUTO: 8.66 K/UL — SIGNIFICANT CHANGE UP (ref 3.8–10.5)

## 2019-05-18 PROCEDURE — 99233 SBSQ HOSP IP/OBS HIGH 50: CPT

## 2019-05-18 RX ADMIN — OXYCODONE AND ACETAMINOPHEN 1 TABLET(S): 5; 325 TABLET ORAL at 13:20

## 2019-05-18 RX ADMIN — OXYCODONE AND ACETAMINOPHEN 1 TABLET(S): 5; 325 TABLET ORAL at 14:20

## 2019-05-18 RX ADMIN — Medication 100 MILLIGRAM(S): at 05:49

## 2019-05-18 RX ADMIN — Medication 100 MILLIGRAM(S): at 21:51

## 2019-05-18 RX ADMIN — Medication 200 MILLIGRAM(S): at 18:02

## 2019-05-18 RX ADMIN — Medication 100 MILLIGRAM(S): at 13:16

## 2019-05-18 RX ADMIN — OXYCODONE AND ACETAMINOPHEN 1 TABLET(S): 5; 325 TABLET ORAL at 19:24

## 2019-05-18 RX ADMIN — Medication 200 MILLIGRAM(S): at 06:55

## 2019-05-18 NOTE — PROGRESS NOTE ADULT - SUBJECTIVE AND OBJECTIVE BOX
Patient is a 44y old  Male who presents with a chief complaint of RLQ abdominal pain (17 May 2019 15:09)      SUBJECTIVE / OVERNIGHT EVENTS:  no events overnight; tolerated clear liquids. still have cramping RLQ pain. watery BM this morning.   no fever chill, vomiting, chest pain, SOB.       MEDICATIONS  (STANDING):  ciprofloxacin   IVPB 400 milliGRAM(s) IV Intermittent every 12 hours  lactated ringers. 1000 milliLiter(s) (100 mL/Hr) IV Continuous <Continuous>  metroNIDAZOLE  IVPB 500 milliGRAM(s) IV Intermittent every 8 hours    MEDICATIONS  (PRN):  oxyCODONE    5 mG/acetaminophen 325 mG 1 Tablet(s) Oral every 6 hours PRN Moderate to Severe Pain      PHYSICAL EXAM:  T(C): 36.9 (05-18-19 @ 14:25), Max: 36.9 (05-18-19 @ 14:25)  HR: 75 (05-18-19 @ 14:25) (75 - 80)  BP: 100/68 (05-18-19 @ 14:25) (100/68 - 122/81)  RR: 16 (05-18-19 @ 14:25) (16 - 18)  SpO2: 98% (05-18-19 @ 14:25) (98% - 98%)  I&O's Summary    18 May 2019 07:01  -  18 May 2019 16:25  --------------------------------------------------------  IN: 0 mL / OUT: 0 mL / NET: 0 mL    Gen: NAD; resting in bed;  Pulm: no respiratory distress; CTA b/l; no wheezing  Card: RRR; S1/S2 wnl; no obvious murmurs  Abd: soft; +bs; ttp RLQ.   Ext: no joint swelling; no edema         LABS:  CAPILLARY BLOOD GLUCOSE                              14.1   8.66  )-----------( 336      ( 18 May 2019 06:00 )             43.4     05-18    140  |  103  |  14  ----------------------------<  92  3.7   |  24  |  1.10    Ca    9.3      18 May 2019 06:00  Phos  3.2     05-17  Mg     2.0     05-17    TPro  7.0  /  Alb  4.3  /  TBili  1.0  /  DBili  x   /  AST  13  /  ALT  11  /  AlkPhos  59  05-17    PT/INR - ( 17 May 2019 06:00 )   PT: 11.4 SEC;   INR: 1.00          PTT - ( 17 May 2019 06:00 )  PTT:31.2 SEC          RADIOLOGY & ADDITIONAL TESTS:    Imaging Personally Reviewed:    Consultant(s) Notes Reviewed:      Care Discussed with Consultants/Other Providers:

## 2019-05-18 NOTE — PROGRESS NOTE ADULT - ASSESSMENT
43 yo man with history of morbid obesity s/p gastric sleeve (2016) presents with 1 day of RLQ abdominal pain, found to have CT A/P findings consistent with terminal ileitis and possibly associated with mild small bowel obstruction. Evaluated by surgery, no bowel obstruction. symptoms clinically improving. no nausea vomiting, had watery BM this morning. tolerating PO.

## 2019-05-18 NOTE — PROGRESS NOTE ADULT - PROBLEM SELECTOR PLAN 1
Can be 2/2 IBD, specifically Crohn's disease, vs. infectious cause vs. less likely malignancy. Pt with leukocytosis, which can be reactive, as pt with no other systemic S/S of infection.   - Surgery consult recs reviewed and appreciated. no bowel obstruction and no need for surgical intervention.   - Pain control with Percocet 5/325 mg q6hrs PRN for moderate-severe pain  - GI consulted, evaluate for Crohn's. possible EGD monday.  - tolerated clear liquid. advance diet as tolerated.   - ESR wnl. mild CRP elevation. less likely autoimmune process.   - c/w Cipro/flagyl to complete 7 day course.   - f/u GI rec regarding EGD.

## 2019-05-19 LAB
ANION GAP SERPL CALC-SCNC: 11 MMO/L — SIGNIFICANT CHANGE UP (ref 7–14)
BUN SERPL-MCNC: 12 MG/DL — SIGNIFICANT CHANGE UP (ref 7–23)
CALCIUM SERPL-MCNC: 9.6 MG/DL — SIGNIFICANT CHANGE UP (ref 8.4–10.5)
CHLORIDE SERPL-SCNC: 103 MMOL/L — SIGNIFICANT CHANGE UP (ref 98–107)
CO2 SERPL-SCNC: 26 MMOL/L — SIGNIFICANT CHANGE UP (ref 22–31)
CREAT SERPL-MCNC: 1.18 MG/DL — SIGNIFICANT CHANGE UP (ref 0.5–1.3)
GI PCR PANEL, STOOL: SIGNIFICANT CHANGE UP
GLUCOSE SERPL-MCNC: 95 MG/DL — SIGNIFICANT CHANGE UP (ref 70–99)
HCT VFR BLD CALC: 43.9 % — SIGNIFICANT CHANGE UP (ref 39–50)
HGB BLD-MCNC: 14.1 G/DL — SIGNIFICANT CHANGE UP (ref 13–17)
MCHC RBC-ENTMCNC: 28 PG — SIGNIFICANT CHANGE UP (ref 27–34)
MCHC RBC-ENTMCNC: 32.1 % — SIGNIFICANT CHANGE UP (ref 32–36)
MCV RBC AUTO: 87.1 FL — SIGNIFICANT CHANGE UP (ref 80–100)
NRBC # FLD: 0.03 K/UL — SIGNIFICANT CHANGE UP (ref 0–0)
PLATELET # BLD AUTO: 337 K/UL — SIGNIFICANT CHANGE UP (ref 150–400)
PMV BLD: 9.5 FL — SIGNIFICANT CHANGE UP (ref 7–13)
POTASSIUM SERPL-MCNC: 4.4 MMOL/L — SIGNIFICANT CHANGE UP (ref 3.5–5.3)
POTASSIUM SERPL-SCNC: 4.4 MMOL/L — SIGNIFICANT CHANGE UP (ref 3.5–5.3)
RBC # BLD: 5.04 M/UL — SIGNIFICANT CHANGE UP (ref 4.2–5.8)
RBC # FLD: 12.3 % — SIGNIFICANT CHANGE UP (ref 10.3–14.5)
SODIUM SERPL-SCNC: 140 MMOL/L — SIGNIFICANT CHANGE UP (ref 135–145)
SPECIMEN SOURCE: SIGNIFICANT CHANGE UP
WBC # BLD: 7.65 K/UL — SIGNIFICANT CHANGE UP (ref 3.8–10.5)
WBC # FLD AUTO: 7.65 K/UL — SIGNIFICANT CHANGE UP (ref 3.8–10.5)

## 2019-05-19 PROCEDURE — 99233 SBSQ HOSP IP/OBS HIGH 50: CPT

## 2019-05-19 RX ORDER — SOD SULF/SODIUM/NAHCO3/KCL/PEG
1000 SOLUTION, RECONSTITUTED, ORAL ORAL EVERY 4 HOURS
Refills: 0 | Status: COMPLETED | OUTPATIENT
Start: 2019-05-19 | End: 2019-05-19

## 2019-05-19 RX ADMIN — Medication 100 MILLIGRAM(S): at 21:31

## 2019-05-19 RX ADMIN — Medication 200 MILLIGRAM(S): at 06:54

## 2019-05-19 RX ADMIN — Medication 200 MILLIGRAM(S): at 18:07

## 2019-05-19 RX ADMIN — Medication 1000 MILLILITER(S): at 21:30

## 2019-05-19 RX ADMIN — Medication 100 MILLIGRAM(S): at 05:11

## 2019-05-19 RX ADMIN — Medication 100 MILLIGRAM(S): at 14:16

## 2019-05-19 NOTE — CHART NOTE - NSCHARTNOTEFT_GEN_A_CORE
Patient is currently feeling well, and denies nausea and vomiting and is tolerating a clear liquid diet. He is having small bowel movements. He endorses mild right sided abdominal pain.     If the patient develops nausea/vomiting or severe abdominal pain, he was instructed to stop drinking the bowel preparation.    Plan for colonoscopy tomorrow.  - Clear liquid diet  - NPO after midnight  - Bowel preparation to be ordered by GI fellow    Please page GI (Pager: [phone]) if there are any additional questions or concerns

## 2019-05-19 NOTE — PROGRESS NOTE ADULT - PROBLEM SELECTOR PLAN 1
Can be 2/2 IBD, specifically Crohn's disease, vs. infectious cause vs. less likely malignancy. Pt with leukocytosis, which can be reactive, as pt with no other systemic S/S of infection.   - Surgery consult recs reviewed and appreciated. no bowel obstruction and no need for surgical intervention.   - Pain control with Percocet 5/325 mg q6hrs PRN for moderate-severe pain  - GI consulted, evaluate for Crohn's. possible EGD monday.  - tolerated clear liquid. advance diet as tolerated.   - ESR wnl. mild CRP elevation. less likely autoimmune process.   - c/w Cipro/flagyl to complete 7 day course.   - plan for Colonoscopy Monday, possible DC after if no complications.

## 2019-05-19 NOTE — PROGRESS NOTE ADULT - SUBJECTIVE AND OBJECTIVE BOX
Patient is a 44y old  Male who presents with a chief complaint of RLQ abdominal pain (18 May 2019 16:25)      SUBJECTIVE / OVERNIGHT EVENTS:  no events. feeling better today. tolerating PO. having small watery BM.   no fever, chill, nausea vomiting diarrhea.       MEDICATIONS  (STANDING):  ciprofloxacin   IVPB 400 milliGRAM(s) IV Intermittent every 12 hours  metroNIDAZOLE  IVPB 500 milliGRAM(s) IV Intermittent every 8 hours  polyethylene glycol/electrolyte Solution 1000 milliLiter(s) Oral every 4 hours    MEDICATIONS  (PRN):  oxyCODONE    5 mG/acetaminophen 325 mG 1 Tablet(s) Oral every 6 hours PRN Moderate to Severe Pain      PHYSICAL EXAM:  T(C): 36.7 (05-19-19 @ 14:13), Max: 36.8 (05-18-19 @ 21:42)  HR: 85 (05-19-19 @ 14:13) (75 - 85)  BP: 111/76 (05-19-19 @ 14:13) (111/76 - 132/74)  RR: 18 (05-19-19 @ 14:13) (18 - 18)  SpO2: 100% (05-19-19 @ 14:13) (97% - 100%)  I&O's Summary    18 May 2019 07:01  -  19 May 2019 07:00  --------------------------------------------------------  IN: 0 mL / OUT: 0 mL / NET: 0 mL            LABS:  CAPILLARY BLOOD GLUCOSE                              14.1   7.65  )-----------( 337      ( 19 May 2019 05:00 )             43.9     05-19    140  |  103  |  12  ----------------------------<  95  4.4   |  26  |  1.18    Ca    9.6      19 May 2019 05:00                RADIOLOGY & ADDITIONAL TESTS:    Imaging Personally Reviewed:    Consultant(s) Notes Reviewed:      Care Discussed with Consultants/Other Providers: Patient is a 44y old  Male who presents with a chief complaint of RLQ abdominal pain (18 May 2019 16:25)      SUBJECTIVE / OVERNIGHT EVENTS:  no events. feeling better today. tolerating PO. having small watery BM.   no fever, chill, nausea vomiting diarrhea.       MEDICATIONS  (STANDING):  ciprofloxacin   IVPB 400 milliGRAM(s) IV Intermittent every 12 hours  metroNIDAZOLE  IVPB 500 milliGRAM(s) IV Intermittent every 8 hours  polyethylene glycol/electrolyte Solution 1000 milliLiter(s) Oral every 4 hours    MEDICATIONS  (PRN):  oxyCODONE    5 mG/acetaminophen 325 mG 1 Tablet(s) Oral every 6 hours PRN Moderate to Severe Pain      PHYSICAL EXAM:  T(C): 36.7 (05-19-19 @ 14:13), Max: 36.8 (05-18-19 @ 21:42)  HR: 85 (05-19-19 @ 14:13) (75 - 85)  BP: 111/76 (05-19-19 @ 14:13) (111/76 - 132/74)  RR: 18 (05-19-19 @ 14:13) (18 - 18)  SpO2: 100% (05-19-19 @ 14:13) (97% - 100%)  I&O's Summary    18 May 2019 07:01  -  19 May 2019 07:00  --------------------------------------------------------  IN: 0 mL / OUT: 0 mL / NET: 0 mL    Gen: NAD; resting in bed;  Pulm: no respiratory distress; CTA b/l; no wheezing  Card: RRR; S1/S2 wnl; no obvious murmurs  Abd: soft; +bs; mild RLQ pain. 	  Ext: no joint swelling; no edema         LABS:  CAPILLARY BLOOD GLUCOSE                              14.1   7.65  )-----------( 337      ( 19 May 2019 05:00 )             43.9     05-19    140  |  103  |  12  ----------------------------<  95  4.4   |  26  |  1.18    Ca    9.6      19 May 2019 05:00                RADIOLOGY & ADDITIONAL TESTS:    Imaging Personally Reviewed:    Consultant(s) Notes Reviewed:      Care Discussed with Consultants/Other Providers:

## 2019-05-19 NOTE — PROGRESS NOTE ADULT - ASSESSMENT
43 yo man with history of morbid obesity s/p gastric sleeve (2016) presents with 1 day of RLQ abdominal pain, found to have CT A/P findings consistent with terminal ileitis. no SBO per surgery eval. clinically improving. plan for colonoscopy Monday to rule-out Crohn's.

## 2019-05-20 ENCOUNTER — TRANSCRIPTION ENCOUNTER (OUTPATIENT)
Age: 44
End: 2019-05-20

## 2019-05-20 ENCOUNTER — RESULT REVIEW (OUTPATIENT)
Age: 44
End: 2019-05-20

## 2019-05-20 VITALS
SYSTOLIC BLOOD PRESSURE: 109 MMHG | RESPIRATION RATE: 17 BRPM | HEART RATE: 79 BPM | HEIGHT: 70 IN | OXYGEN SATURATION: 99 % | TEMPERATURE: 98 F | DIASTOLIC BLOOD PRESSURE: 72 MMHG | WEIGHT: 237.44 LBS

## 2019-05-20 LAB
ANION GAP SERPL CALC-SCNC: 16 MMO/L — HIGH (ref 7–14)
BUN SERPL-MCNC: 15 MG/DL — SIGNIFICANT CHANGE UP (ref 7–23)
CALCIUM SERPL-MCNC: 9.3 MG/DL — SIGNIFICANT CHANGE UP (ref 8.4–10.5)
CHLORIDE SERPL-SCNC: 102 MMOL/L — SIGNIFICANT CHANGE UP (ref 98–107)
CO2 SERPL-SCNC: 22 MMOL/L — SIGNIFICANT CHANGE UP (ref 22–31)
CREAT SERPL-MCNC: 1.15 MG/DL — SIGNIFICANT CHANGE UP (ref 0.5–1.3)
GLUCOSE SERPL-MCNC: 91 MG/DL — SIGNIFICANT CHANGE UP (ref 70–99)
HCT VFR BLD CALC: 41.8 % — SIGNIFICANT CHANGE UP (ref 39–50)
HGB BLD-MCNC: 13.8 G/DL — SIGNIFICANT CHANGE UP (ref 13–17)
MCHC RBC-ENTMCNC: 28.4 PG — SIGNIFICANT CHANGE UP (ref 27–34)
MCHC RBC-ENTMCNC: 33 % — SIGNIFICANT CHANGE UP (ref 32–36)
MCV RBC AUTO: 86 FL — SIGNIFICANT CHANGE UP (ref 80–100)
NRBC # FLD: 0 K/UL — SIGNIFICANT CHANGE UP (ref 0–0)
PLATELET # BLD AUTO: 346 K/UL — SIGNIFICANT CHANGE UP (ref 150–400)
PMV BLD: 9.5 FL — SIGNIFICANT CHANGE UP (ref 7–13)
POTASSIUM SERPL-MCNC: 4.1 MMOL/L — SIGNIFICANT CHANGE UP (ref 3.5–5.3)
POTASSIUM SERPL-SCNC: 4.1 MMOL/L — SIGNIFICANT CHANGE UP (ref 3.5–5.3)
RBC # BLD: 4.86 M/UL — SIGNIFICANT CHANGE UP (ref 4.2–5.8)
RBC # FLD: 12.4 % — SIGNIFICANT CHANGE UP (ref 10.3–14.5)
SODIUM SERPL-SCNC: 140 MMOL/L — SIGNIFICANT CHANGE UP (ref 135–145)
WBC # BLD: 8.17 K/UL — SIGNIFICANT CHANGE UP (ref 3.8–10.5)
WBC # FLD AUTO: 8.17 K/UL — SIGNIFICANT CHANGE UP (ref 3.8–10.5)

## 2019-05-20 PROCEDURE — 99239 HOSP IP/OBS DSCHRG MGMT >30: CPT

## 2019-05-20 PROCEDURE — 88305 TISSUE EXAM BY PATHOLOGIST: CPT | Mod: 26

## 2019-05-20 PROCEDURE — 45380 COLONOSCOPY AND BIOPSY: CPT | Mod: GC

## 2019-05-20 RX ORDER — MORPHINE SULFATE 50 MG/1
4 CAPSULE, EXTENDED RELEASE ORAL ONCE
Refills: 0 | Status: DISCONTINUED | OUTPATIENT
Start: 2019-05-20 | End: 2019-05-20

## 2019-05-20 RX ORDER — MOXIFLOXACIN HYDROCHLORIDE TABLETS, 400 MG 400 MG/1
1 TABLET, FILM COATED ORAL
Qty: 14 | Refills: 0
Start: 2019-05-20 | End: 2019-05-26

## 2019-05-20 RX ORDER — METRONIDAZOLE 500 MG
1 TABLET ORAL
Qty: 21 | Refills: 0
Start: 2019-05-20 | End: 2019-05-26

## 2019-05-20 RX ADMIN — Medication 200 MILLIGRAM(S): at 05:33

## 2019-05-20 RX ADMIN — Medication 100 MILLIGRAM(S): at 05:34

## 2019-05-20 RX ADMIN — MORPHINE SULFATE 4 MILLIGRAM(S): 50 CAPSULE, EXTENDED RELEASE ORAL at 01:54

## 2019-05-20 RX ADMIN — MORPHINE SULFATE 4 MILLIGRAM(S): 50 CAPSULE, EXTENDED RELEASE ORAL at 01:39

## 2019-05-20 RX ADMIN — Medication 100 MILLIGRAM(S): at 16:09

## 2019-05-20 NOTE — DISCHARGE NOTE NURSING/CASE MANAGEMENT/SOCIAL WORK - NSDCDPATPORTLINK_GEN_ALL_CORE
You can access the [facility], offered by [facility], by registering with the following website: [email or web address]

## 2019-05-20 NOTE — DISCHARGE NOTE PROVIDER - NSDCCPCAREPLAN_GEN_ALL_CORE_FT
PRINCIPAL DISCHARGE DIAGNOSIS  Diagnosis: Terminal ileitis, with intestinal obstruction  Assessment and Plan of Treatment: -c/w IV Cipro and Flagyl  to complete 10 days  -Per Surgery, no bowel obstruction and no need for surgical intervention.   - GI consulted, evaluate for Crohn's.   - given improvement of symptoms, will advance diet to clear liquids and monitor response.  - ESR:13.0 ;CRP 10.2 elevated   -Stool GI pcr: neg   -5/20 colonscopy:   - No inflammation that correlates to the CT findings of ileitis. Biopsies taken..                       - Follow up pathology                       - If pain is resolved and tolerating diet, may be d/c                        from GI standpoint with outpatient follow up with plan                        for repeat CT w enterography in 1 month.  Please follow up with your pcp within 1 week of discharge.  Please call to make an appointment within 1 week of dc

## 2019-05-20 NOTE — DISCHARGE NOTE PROVIDER - HOSPITAL COURSE
45 yo man with history of morbid obesity s/p gastric sleeve (2016) presents with 1 day of RLQ abdominal pain, found to have CT A/P findings consistent with terminal ileitis. no SBO per surgery eval. clinically improving. plan for colonoscopy Monday to rule-out Crohn's.             Terminal ileitis, with intestinal obstruction     -possibly 2/2 IBD, specifically Crohn's disease, vs. infectious cause vs. less likely malignancy    -c/w IV Cipro and Flagyl  to complete 10 days    -Per Surgery, no bowel obstruction and no need for surgical intervention.     - GI consulted, evaluate for Crohn's.     - given improvement of symptoms, will advance diet to clear liquids and monitor response.    - ESR:13.0 ;CRP 10.2 elevated     -Stool GI pcr: neg     -5/20 colonscopy:   - No inflammation that correlates to the CT findings of                          ileitis. Biopsies taken..                         - Follow up pathology                         - If pain is resolved and tolerating diet, may be d/c                          from GI standpoint with outpatient follow up with plan                          for repeat CT w enterography in 1 month.    Dispo: Home 45 yo man with history of morbid obesity s/p gastric sleeve (2016) presents with 1 day of RLQ abdominal pain, found to have CT A/P findings consistent with terminal ileitis.          - ESR:13.0 ;CRP 10.2 elevated     - Stool GI pcr: neg     - started on cipro/flagyl x 10 days    - surgery consulted-  no bowel obstruction and no need for surgical intervention.     - GI consulted to evaluate for possible IBD -rec colonoscopy         -5/20 colonscopy:   - No inflammation that correlates to the CT findings of                          ileitis. Biopsies taken..                         - Follow up pathology                        - outpatient follow up with plan for repeat CT w enterography in 1 month.        At time of discharge, pt denies abd pain. tolerated diet. He was discharged home with plan to f/u GI as outpt         Dispo: Home

## 2019-05-20 NOTE — CHART NOTE - NSCHARTNOTEFT_GEN_A_CORE
pt seen and examined. VS, labs, GI/ colonoscopy report reviewed. Pt denies abd pain and tolerating po    pt is stable for discharge home today. Instructed pt to complete abx course and f/u GI as outpt for biopsy result. pt verbalized understanding     total time spent on discharge 35min

## 2019-05-20 NOTE — PRE-OP CHECKLIST - PATIENT'S PERSONAL PROPERTY GIVEN TO
Your test results will be communicated to you via: [facility], Telephone or Letter.  If you have not received your test results within one week. Please contact the clinic.      
on unit
